# Patient Record
Sex: MALE | Race: OTHER | HISPANIC OR LATINO | URBAN - METROPOLITAN AREA
[De-identification: names, ages, dates, MRNs, and addresses within clinical notes are randomized per-mention and may not be internally consistent; named-entity substitution may affect disease eponyms.]

---

## 2017-05-29 ENCOUNTER — EMERGENCY (EMERGENCY)
Facility: HOSPITAL | Age: 22
LOS: 1 days | Discharge: HOME | End: 2017-05-29
Attending: EMERGENCY MEDICINE
Payer: SELF-PAY

## 2017-05-29 VITALS
SYSTOLIC BLOOD PRESSURE: 117 MMHG | HEIGHT: 67 IN | OXYGEN SATURATION: 99 % | RESPIRATION RATE: 16 BRPM | TEMPERATURE: 100 F | HEART RATE: 78 BPM | DIASTOLIC BLOOD PRESSURE: 74 MMHG | WEIGHT: 134.92 LBS

## 2017-05-29 DIAGNOSIS — J40 BRONCHITIS, NOT SPECIFIED AS ACUTE OR CHRONIC: ICD-10-CM

## 2017-05-29 PROCEDURE — 71046 X-RAY EXAM CHEST 2 VIEWS: CPT

## 2017-05-29 PROCEDURE — 99284 EMERGENCY DEPT VISIT MOD MDM: CPT

## 2017-05-29 PROCEDURE — 99283 EMERGENCY DEPT VISIT LOW MDM: CPT | Mod: 25

## 2017-05-29 PROCEDURE — 71020: CPT | Mod: 26

## 2017-05-29 RX ORDER — BENZOCAINE AND MENTHOL 5; 1 G/100ML; G/100ML
1 LIQUID ORAL ONCE
Qty: 0 | Refills: 0 | Status: COMPLETED | OUTPATIENT
Start: 2017-05-29 | End: 2017-05-29

## 2017-05-29 RX ORDER — BENZOCAINE AND MENTHOL 5; 1 G/100ML; G/100ML
1 LIQUID ORAL
Qty: 16 | Refills: 0 | OUTPATIENT
Start: 2017-05-29 | End: 2017-06-02

## 2017-05-29 RX ADMIN — BENZOCAINE AND MENTHOL 1 LOZENGE: 5; 1 LIQUID ORAL at 17:19

## 2017-05-29 NOTE — ED PROVIDER NOTE - OBJECTIVE STATEMENT
23 y/o male with no significant PMHx presents to the ED c/o bloody sputum x2 days. Pt notes gradual onset of productive cough with green sputum 2 days ago. Today, pt noticed after significant coughing a small amount of blood in sputum.  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 Pt denies fever, chills, body aches, ear pain, CP, SOB, night sweats, weight loss, recent sick contacts, or any other complaints. NKDA. Refused , prefers for cousin to translate: 21 y/o male with no significant PMHx presents to the ED c/o bloody sputum x2 days. Pt notes gradual onset of productive cough with green sputum 2 days ago. Today, pt noticed after significant coughing a small amount of blood in sputum.  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 Pt denies fever, chills, body aches, ear pain, CP, SOB, night sweats, weight loss, recent sick contacts, or any other complaints. NKDA.

## 2017-05-29 NOTE — ED PROVIDER NOTE - CHPI ED SYMPTOMS NEG
no chills/no shortness of breath/no chest pain/no body aches, no ear pain, no night sweats, no weight loss/no fever

## 2017-05-29 NOTE — ED PROVIDER NOTE - PROGRESS NOTE DETAILS
CXR NAD. History and findings suggestive of bronchitis, likely viral. Will discharge with PMD follow up through care coordinator. Pt is well appearing walking with steady gait, stable for discharge and follow up without fail with medical doctor. I had a detailed discussion with the patient and/or guardian regarding the historical points, exam findings, and any diagnostic results supporting the discharge diagnosis. Pt educated on care and need for follow up. Strict return instructions and red flag signs and symptoms discussed with patient. Questions answered. Pt shows understanding of discharge information and agrees to follow. The scribe's documentation has been prepared under my direction and personally reviewed by me in its entirety. I confirm that the note above actually reflects all work, treatment, procedures, and medical decision-making performed by me - ERVIN Burrell

## 2017-05-29 NOTE — ED PROVIDER NOTE - MEDICAL DECISION MAKING DETAILS
23 yo presents with productive cough x2 days. Pt is well appearing. Vital signs WNL, afebrile. Will get CXR, give Cepacol and DC with outpatient PMD follow up.

## 2017-05-29 NOTE — ED ADULT NURSE NOTE - OBJECTIVE STATEMENT
Presented to ED complaining of "cough, nose bleeds and bloody sputum since arriving from [place] 15 days ago." AA&Ox3. Denies fever and night sweats. Breathing on room air.

## 2017-05-29 NOTE — ED PROVIDER NOTE - NS ED MD SCRIBE ATTENDING SCRIBE SECTIONS
PAST MEDICAL/SURGICAL/SOCIAL HISTORY/PHYSICAL EXAM/HISTORY OF PRESENT ILLNESS/VITAL SIGNS( Pullset)/HIV/DISPOSITION/REVIEW OF SYSTEMS